# Patient Record
Sex: MALE | Race: BLACK OR AFRICAN AMERICAN | Employment: FULL TIME | URBAN - METROPOLITAN AREA
[De-identification: names, ages, dates, MRNs, and addresses within clinical notes are randomized per-mention and may not be internally consistent; named-entity substitution may affect disease eponyms.]

---

## 2019-06-17 ENCOUNTER — APPOINTMENT (OUTPATIENT)
Dept: GENERAL RADIOLOGY | Age: 24
End: 2019-06-17
Payer: COMMERCIAL

## 2019-06-17 ENCOUNTER — HOSPITAL ENCOUNTER (EMERGENCY)
Age: 24
Discharge: HOME | End: 2019-06-17
Attending: EMERGENCY MEDICINE
Payer: COMMERCIAL

## 2019-06-17 VITALS
WEIGHT: 270 LBS | DIASTOLIC BLOOD PRESSURE: 87 MMHG | RESPIRATION RATE: 16 BRPM | SYSTOLIC BLOOD PRESSURE: 135 MMHG | HEIGHT: 73 IN | HEART RATE: 89 BPM | OXYGEN SATURATION: 98 % | BODY MASS INDEX: 35.78 KG/M2 | TEMPERATURE: 98.4 F

## 2019-06-17 DIAGNOSIS — M79.10 MYALGIA: Primary | ICD-10-CM

## 2019-06-17 LAB
APTT: 32.4 SEC (ref 26–36)
D DIMER: <200 NG/ML DDU (ref 0–229)
INR BLD: 1.04 (ref 0.86–1.14)
PROTHROMBIN TIME: 11.9 SEC (ref 9.8–13)

## 2019-06-17 PROCEDURE — 85610 PROTHROMBIN TIME: CPT

## 2019-06-17 PROCEDURE — 99283 EMERGENCY DEPT VISIT LOW MDM: CPT

## 2019-06-17 PROCEDURE — 85730 THROMBOPLASTIN TIME PARTIAL: CPT

## 2019-06-17 PROCEDURE — 85379 FIBRIN DEGRADATION QUANT: CPT

## 2019-06-17 PROCEDURE — 73562 X-RAY EXAM OF KNEE 3: CPT

## 2019-06-17 RX ORDER — CYCLOBENZAPRINE HCL 10 MG
10 TABLET ORAL 3 TIMES DAILY PRN
COMMUNITY

## 2019-06-17 RX ORDER — IBUPROFEN 800 MG/1
800 TABLET ORAL EVERY 6 HOURS PRN
COMMUNITY

## 2019-06-17 RX ORDER — NAPROXEN 500 MG/1
500 TABLET ORAL 2 TIMES DAILY
Qty: 20 TABLET | Refills: 0 | Status: SHIPPED | OUTPATIENT
Start: 2019-06-17 | End: 2019-06-27

## 2019-06-17 ASSESSMENT — ENCOUNTER SYMPTOMS
RESPIRATORY NEGATIVE: 1
SHORTNESS OF BREATH: 0
EYES NEGATIVE: 1
GASTROINTESTINAL NEGATIVE: 1

## 2019-06-17 ASSESSMENT — PAIN DESCRIPTION - LOCATION: LOCATION: LEG

## 2019-06-17 ASSESSMENT — PAIN SCALES - GENERAL: PAINLEVEL_OUTOF10: 7

## 2019-06-17 ASSESSMENT — PAIN DESCRIPTION - ORIENTATION: ORIENTATION: LEFT

## 2019-06-17 NOTE — ED PROVIDER NOTES
[facility]  ED  eMERGENCY dEPARTMENT eNCOUnter      Pt Name: Emily Espinoza  MRN: [id number]  [place] 1995  Date of evaluation: 6/17/2019  Provider: Anju Ayers MD    [address] [place] [address]       Chief Complaint   Patient presents with    Leg Pain     left leg pain behind left klnee and in left groin area. left pedal pulse palpable. HISTORY OF PRESENT ILLNESS   (Location/Symptom, Timing/Onset,Context/Setting, Quality, Duration, Modifying Factors, Severity)  Note limiting factors. HPI: Emily Espinoza is a 24 y.o. male, with hx of remote L ankle surgery, who presents to the emergency department with pain over his left calf and left popliteal area. Patient denies any specific injury. 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 Patient describes the pain as achy, and associated with his posterior left leg. Patient denies any fever chills or systemic symptoms. Patient denies any chest pain or shortness of breath. Patient notes the pain is worse with palpation and ambulation. Patient took some ibuprofen prior to arrival with some relief. NursingNotes were reviewed. REVIEW OF SYSTEMS    (2-9 systems for level 4, 10 or more for level 5)     Review of Systems   Constitutional: Negative. HENT: Negative. Eyes: Negative. Respiratory: Negative. Negative for shortness of breath. Cardiovascular: Negative. Negative for chest pain. Gastrointestinal: Negative. Genitourinary: Negative. Musculoskeletal: Positive for myalgias. Negative for arthralgias. Skin: Negative. Neurological: Negative. Psychiatric/Behavioral: Negative. Except as noted above the remainder of the review of systems was reviewed and negative. PAST MEDICAL HISTORY   History reviewed. No pertinent past medical history.       SURGICALHISTORY       Past Surgical History:   Procedure Laterality Date    ANKLE SURGERY      left    TONSILLECTOMY           CURRENT MEDICATIONS       Previous Medications    CYCLOBENZAPRINE (FLEXERIL) 10 MG TABLET    Take 10 mg by mouth 3 times daily as needed for Muscle spasms    IBUPROFEN (ADVIL;MOTRIN) 800 MG TABLET    Take 800 mg by mouth every 6 hours as needed for Pain       ALLERGIES     Patient has no known allergies. FAMILY HISTORY     History reviewed. No pertinent family history.     SOCIAL HISTORY       Social History     Socioeconomic History    Marital status: Single     Spouse name: None    Number of children: None    Years of education: None    Highest education level: None   Occupational History    None   Social Needs    Financial resource strain: None    Food insecurity:     Worry: None     Inability: None    Transportation needs:     Medical: None     Non-medical: None   Tobacco Use    Smoking status: Current Every Day Smoker     Packs/day: 0.25     Types: Cigarettes   Substance and Sexual Activity    Alcohol use: Yes     Comment: monthly    Drug use: Yes     Types: Marijuana    Sexual activity: None   Lifestyle    Physical activity:     Days per week: None     Minutes per session: None    Stress: None   Relationships    Social connections:     Talks on phone: None     Gets together: None     Attends [employer] service: None     Active member of club or organization: None     Attends meetings of clubs or organizations: None     Relationship status: None    Intimate partner violence:     Fear of current or ex partner: None     Emotionally abused: None     Physically abused: None     Forced sexual activity: None   Other Topics Concern    None   Social History Narrative    None       SCREENINGS             PHYSICAL EXAM    (up to 7 for level 4, 8 or more for level 5)     ED Triage Vitals   BP Temp Temp src Pulse Resp SpO2 Height Weight   -- -- -- -- -- -- -- --       Physical Exam   Constitutional: He is oriented to person, place, and time. He appears well-developed and well-nourished. No distress. HENT:   Head: Normocephalic and atraumatic. Eyes: EOM are normal. Right eye exhibits no discharge. Left eye exhibits no discharge. Neck: Normal range of motion. Neck supple. Cardiovascular: Normal rate and regular rhythm. Pulmonary/Chest: Effort normal. No stridor. No respiratory distress. Musculoskeletal:        Left knee: He exhibits normal range of motion, no swelling, no effusion, no ecchymosis, no deformity, no laceration, no erythema, normal alignment, no LCL laxity, normal patellar mobility, no bony tenderness, normal meniscus and no MCL laxity. Tenderness (popliteal and proximal calf) found. No medial joint line, no lateral joint line, no MCL, no LCL and no patellar tendon tenderness noted. Left lower leg: He exhibits tenderness. He exhibits no bony tenderness, no swelling, no edema, no deformity and no laceration. Legs:  Neurological: He is alert and oriented to person, place, and time. No cranial nerve deficit or sensory deficit. He exhibits normal muscle tone. Coordination normal.   Skin: Skin is warm. Capillary refill takes less than 2 seconds. Psychiatric: He has a normal mood and affect. His behavior is normal.   Nursing note and vitals reviewed. DIAGNOSTIC RESULTS     RADIOLOGY:   Non-plain filmimages such as CT, Ultrasound and MRI are read by the radiologist. Plain radiographic images are visualized and preliminarily interpreted by the emergency physician with the below findings:    Interpretation per the Radiologist below, if available at the time ofthis note:  XR KNEE LEFT (3 VIEWS)   Final Result   Mild suprapatellar fluid. No acute osseous abnormality.              ED BEDSIDE ULTRASOUND:   Performed by ED Physician - none    LABS:  Results for orders placed or performed during the hospital encounter of 06/17/19   D-Dimer, Quantitative   Result Value Ref Range    D-Dimer, Quant <200 0 - 229 ng/mL DDU   Protime-INR   Result Value Ref Range    Protime 11.9 9.8 - 13.0 sec    INR 1.04 0.86 - 1.14   APTT   Result Value Ref Range    aPTT 32.4 26.0 - 36.0 sec     Xr Knee Left (3 Views)    Result Date: 6/17/2019  EXAMINATION: 3 XRAY VIEWS OF THE LEFT KNEE 6/17/2019 3:10 am COMPARISON: None. HISTORY: ORDERING SYSTEM PROVIDED HISTORY: posterior knee pain TECHNOLOGIST PROVIDED HISTORY: Reason for exam:->posterior knee pain Ordering Physician Provided Reason for Exam: post knee pain, no trauma Acuity: Acute Type of Exam: Initial FINDINGS: Mineralization and alignment are satisfactory. No acute fracture, or dislocation. Mild suprapatellar effusion is noted. Mild suprapatellar fluid. No acute osseous abnormality. All other labs were within normal range or not returned as of this dictation. EMERGENCY DEPARTMENT COURSE and DIFFERENTIAL DIAGNOSIS/MDM:   Vitals:    Vitals:    06/17/19 0248   BP: 135/87   Pulse: 89   Resp: 16   Temp: 98.4 °F (36.9 °C)   TempSrc: Oral   SpO2: 98%   Weight: 270 lb (122.5 kg)   Height: 6' 1\" (1.854 m)       MDM: Most concern for Baker's cyst, myalgia, and possible DVT. Patient is low risk for DVT but does have tenderness to palpation over the deep venous system. With that mind we will check a d-dimer. For Baker's cyst concern x-ray is pending. Cholo Ravi' Criteria for DVT from RESULT SUMMARY: 1 points  Moderate risk group for DVT.      INPUTS:  Active cancer --> 0 = No  Bedridden recently >3 days or major surgery within 12 weeks --> 0 = No  Calf swelling >3 cm compared to the other leg --> 0 = No  Collateral (nonvaricose) superficial veins present --> 0 = No  Entire leg swollen --> 0 = No  Localized tenderness along the deep venous system --> 1 = Yes  Pitting edema, confined to symptomatic leg --> 0 = No  Paralysis, paresis, or recent plaster immobilization of the lower extremity --> 0 = No  Previously documented DVT --> 0 = No  Alternative diagnosis to DVT as likely or more likely --> 0 = No    Work-up is negative for any DVT or Baker's cyst.  Patient has a mild suprapatellar effusion but this is not in the area of discomfort. I am unable to palpate any sufficient amount of fluid, do not feel at this time that it would be beneficial for the patient for me to try to drain that area. The joint is not warm I am not concerned about a septic joint. Patient notes that he is been working more on his knees, and this may be related to that. At this time I do not see any sign of any knee strain, ligamentous injury, meniscal injury, and his calf pain is likely due to muscle strain. Will discharge patient with Naprosyn and PMD follow-up. Patient feeling better at this time and happy with discharge plan. CRITICAL CARE TIME   Total Critical Care time was 0 minutes, excluding separately reportable procedures. CONSULTS:  None    PROCEDURES:  Unless otherwise noted below, none     Procedures    FINAL IMPRESSION      1.  Myalgia          DISPOSITION/PLAN   DISPOSITION        PATIENT REFERRED TO:  Your Doctor      muscle strain, possible small collection of fluid above your knee      DISCHARGE MEDICATIONS:  New Prescriptions    NAPROXEN (NAPROSYN) 500 MG TABLET    Take 1 tablet by mouth 2 times daily for 20 doses          (Please note that portions of this note were completed with a voice recognition program.Efforts were made to edit the dictations but occasionally words are mis-transcribed.)    Trever Arriaza MD (electronically signed)  Attending Emergency Physician        Holly Medina MD  06/17/19 4029

## 2021-06-02 ENCOUNTER — CLINICAL DOCUMENTATION (OUTPATIENT)
Dept: OTHER | Age: 26
End: 2021-06-02